# Patient Record
Sex: MALE | NOT HISPANIC OR LATINO | URBAN - NONMETROPOLITAN AREA
[De-identification: names, ages, dates, MRNs, and addresses within clinical notes are randomized per-mention and may not be internally consistent; named-entity substitution may affect disease eponyms.]

---

## 2019-08-09 ENCOUNTER — OFFICE VISIT (OUTPATIENT)
Dept: URBAN - NONMETROPOLITAN AREA CLINIC 9 | Facility: CLINIC | Age: 59
End: 2019-08-09
Payer: MEDICARE

## 2019-08-09 DIAGNOSIS — H11.012 AMYLOID PTERYGIUM OF LEFT EYE: Chronic | ICD-10-CM

## 2019-08-09 DIAGNOSIS — E11.9 TYPE 2 DIABETES MELLITUS WITHOUT COMPLICATIONS: Primary | Chronic | ICD-10-CM

## 2019-08-09 DIAGNOSIS — Z79.4 LONG TERM (CURRENT) USE OF INSULIN: Chronic | ICD-10-CM

## 2019-08-09 PROCEDURE — 92014 COMPRE OPH EXAM EST PT 1/>: CPT | Performed by: OPTOMETRIST

## 2019-08-09 ASSESSMENT — INTRAOCULAR PRESSURE
OS: 19
OD: 18

## 2019-08-09 NOTE — IMPRESSION/PLAN
Impression: Amyloid pterygium of left eye: H11.012. Plan: Small pterygium accounts for the patients symptoms. Discussed diagnosis with patient. Systane Balance 1 drop OU QID. Emphasize importance of UV protection and lubrication.

## 2019-08-09 NOTE — IMPRESSION/PLAN
Impression: Type 2 diabetes mellitus without complications: J76.4. OU. Plan: Diabetes type II: no background diabetic retinopathy, no signs of neovascularization noted. Discussed ocular and systemic benefits of blood sugar control. Continue to monitor for changes. Advised patient to RTC immediately if changes to vision are noted.

## 2019-08-09 NOTE — IMPRESSION/PLAN
Impression: Crystalline deposits in vitreous body, left eye: H43.22. Plan: Asteroid hyalosis. Discussed diagnosis with patient in detail. No treatment is required at this time. Will continue to observe condition and/or symptoms. Patient instructed to call if condition gets worse.

## 2020-09-23 ENCOUNTER — OFFICE VISIT (OUTPATIENT)
Dept: URBAN - NONMETROPOLITAN AREA CLINIC 9 | Facility: CLINIC | Age: 60
End: 2020-09-23
Payer: MEDICARE

## 2020-09-23 DIAGNOSIS — H25.13 AGE-RELATED NUCLEAR CATARACT, BILATERAL: ICD-10-CM

## 2020-09-23 DIAGNOSIS — H43.22 CRYSTALLINE DEPOSITS IN VITREOUS BODY, LEFT EYE: ICD-10-CM

## 2020-09-23 PROCEDURE — 92014 COMPRE OPH EXAM EST PT 1/>: CPT | Performed by: OPTOMETRIST

## 2020-09-23 ASSESSMENT — INTRAOCULAR PRESSURE
OS: 19
OD: 17

## 2020-09-23 ASSESSMENT — VISUAL ACUITY
OS: 20/20
OD: 20/20

## 2020-09-23 ASSESSMENT — KERATOMETRY
OS: 46.88
OD: 46.88

## 2020-09-23 NOTE — IMPRESSION/PLAN
Impression: Crystalline deposits in vitreous body, left eye: H43.22. Plan: Asteroid hyalosis. Appears stable. Monitor.

## 2020-09-23 NOTE — IMPRESSION/PLAN
Impression: Type 2 diabetes mellitus w/o complication: C82.3. Plan: Diabetes type II: no background retinopathy, no signs of neovascularization noted. Discussed ocular and systemic benefits of blood sugar control.